# Patient Record
Sex: FEMALE | Race: WHITE | NOT HISPANIC OR LATINO | URBAN - METROPOLITAN AREA
[De-identification: names, ages, dates, MRNs, and addresses within clinical notes are randomized per-mention and may not be internally consistent; named-entity substitution may affect disease eponyms.]

---

## 2017-04-19 ENCOUNTER — IMPORTED ENCOUNTER (OUTPATIENT)
Dept: URBAN - METROPOLITAN AREA CLINIC 31 | Facility: CLINIC | Age: 52
End: 2017-04-19

## 2017-04-19 PROCEDURE — 92310 CONTACT LENS FITTING OU: CPT

## 2017-04-19 PROCEDURE — 92014 COMPRE OPH EXAM EST PT 1/>: CPT

## 2017-04-19 NOTE — PATIENT DISCUSSION
1.  Refractive error - Continue present contact lens modality. Stop/wear and call if any redness pain or decrease in vision occur. 2.  Return for an appointment in 1 year for comprehensive exam. with Dr. Frannie Woodward.

## 2018-04-20 ENCOUNTER — IMPORTED ENCOUNTER (OUTPATIENT)
Dept: URBAN - METROPOLITAN AREA CLINIC 31 | Facility: CLINIC | Age: 53
End: 2018-04-20

## 2018-04-20 PROCEDURE — 92250 FUNDUS PHOTOGRAPHY W/I&R: CPT

## 2018-04-20 PROCEDURE — 92310 CONTACT LENS FITTING OU: CPT

## 2018-04-20 PROCEDURE — 92014 COMPRE OPH EXAM EST PT 1/>: CPT

## 2018-04-20 NOTE — PATIENT DISCUSSION
1.  Refractive error- Continue present contact lens modality. Stop/wear and call if any redness pain or decrease in vision occur. Glasses change optional. 2.  Return for an appointment in 1 year for comprehensive exam. with Dr. Frannie Woodward.

## 2020-05-06 ENCOUNTER — IMPORTED ENCOUNTER (OUTPATIENT)
Dept: URBAN - METROPOLITAN AREA CLINIC 31 | Facility: CLINIC | Age: 55
End: 2020-05-06

## 2020-05-06 PROBLEM — H16.012: Noted: 2020-05-06

## 2020-05-06 PROCEDURE — 92012 INTRM OPH EXAM EST PATIENT: CPT

## 2020-05-07 ENCOUNTER — IMPORTED ENCOUNTER (OUTPATIENT)
Dept: URBAN - METROPOLITAN AREA CLINIC 31 | Facility: CLINIC | Age: 55
End: 2020-05-07

## 2020-05-07 PROBLEM — H16.012: Noted: 2020-05-07

## 2020-05-07 PROCEDURE — 92012 INTRM OPH EXAM EST PATIENT: CPT

## 2020-05-11 ENCOUNTER — IMPORTED ENCOUNTER (OUTPATIENT)
Dept: URBAN - METROPOLITAN AREA CLINIC 31 | Facility: CLINIC | Age: 55
End: 2020-05-11

## 2020-05-11 PROBLEM — H16.012: Noted: 2020-05-11

## 2020-05-11 PROCEDURE — 99213 OFFICE O/P EST LOW 20 MIN: CPT

## 2020-05-11 NOTE — PATIENT DISCUSSION
Corneal ulcer OS: decrease moxi taper off in a weekResume CL wear in a weekF/u PRN with meBKM CL exam in coming weeks

## 2020-12-15 NOTE — PROCEDURE NOTE: CLINICAL
PROCEDURE NOTE: Punctal Plugs, Bogdan Kemp ([id number], [id number]) #2 Prior to treatment, the risks/benefits/alternatives were discussed. The patient wished to proceed with procedure. Temporary collagen plugs were inserted. Patient tolerated procedure well. There were no complications. Post procedure instructions given. Minesh Harden

## 2021-07-09 NOTE — PATIENT DISCUSSION
Recommended against surgery at this time due to not visually significant and overall age and health.

## 2021-07-09 NOTE — PATIENT DISCUSSION
The patient notes gradual worsening of visual acuity. This is likely due to progressive atrophy and unfortunately is not treatable. No signs of CNVM, AREDS 2 not recommend due to overall age and health. Will f/u PRN.

## 2022-04-02 ASSESSMENT — VISUAL ACUITY
OD_SC: 20/30
OS_SC: 20/30
OD_SC: 20/25+2
OD_SC: 20/20
OS_SC: 20/20
OS_SC: 20/25+2
OD_SC: 20/25-2
OS_SC: 20/30-2

## 2022-04-02 ASSESSMENT — TONOMETRY
OD_IOP_MMHG: 14
OS_IOP_MMHG: 13

## 2022-12-02 ENCOUNTER — COMPREHENSIVE EXAM (OUTPATIENT)
Dept: URBAN - METROPOLITAN AREA CLINIC 29 | Facility: CLINIC | Age: 57
End: 2022-12-02

## 2022-12-02 PROCEDURE — 92310-2 LEVEL 2 CONTACT LENS MANAGEMENT

## 2022-12-02 PROCEDURE — 92014 COMPRE OPH EXAM EST PT 1/>: CPT

## 2022-12-02 PROCEDURE — 92015 DETERMINE REFRACTIVE STATE: CPT

## 2022-12-02 ASSESSMENT — VISUAL ACUITY
OD_CC: 20/30
OS_CC: 20/30+2

## 2022-12-02 NOTE — PATIENT DISCUSSION
Corneal ulcer OS: decrease moxi taper off in a weekResume CL wear in a weekF/u PRN with meBKM CL exam in coming weeks.